# Patient Record
Sex: FEMALE | Race: BLACK OR AFRICAN AMERICAN | NOT HISPANIC OR LATINO
[De-identification: names, ages, dates, MRNs, and addresses within clinical notes are randomized per-mention and may not be internally consistent; named-entity substitution may affect disease eponyms.]

---

## 2020-10-27 PROBLEM — Z00.00 ENCOUNTER FOR PREVENTIVE HEALTH EXAMINATION: Status: ACTIVE | Noted: 2020-10-27

## 2020-11-06 ENCOUNTER — APPOINTMENT (OUTPATIENT)
Dept: GASTROENTEROLOGY | Facility: CLINIC | Age: 70
End: 2020-11-06
Payer: MEDICARE

## 2020-11-06 VITALS
WEIGHT: 163 LBS | HEIGHT: 72 IN | OXYGEN SATURATION: 97 % | BODY MASS INDEX: 22.08 KG/M2 | RESPIRATION RATE: 15 BRPM | SYSTOLIC BLOOD PRESSURE: 142 MMHG | TEMPERATURE: 98.1 F | DIASTOLIC BLOOD PRESSURE: 90 MMHG

## 2020-11-06 DIAGNOSIS — Z12.11 ENCOUNTER FOR SCREENING FOR MALIGNANT NEOPLASM OF COLON: ICD-10-CM

## 2020-11-06 DIAGNOSIS — Z12.12 ENCOUNTER FOR SCREENING FOR MALIGNANT NEOPLASM OF COLON: ICD-10-CM

## 2020-11-06 PROCEDURE — 36415 COLL VENOUS BLD VENIPUNCTURE: CPT

## 2020-11-06 PROCEDURE — 99203 OFFICE O/P NEW LOW 30 MIN: CPT | Mod: 25

## 2020-11-08 NOTE — HISTORY OF PRESENT ILLNESS
[Heartburn] : denies heartburn [Nausea] : denies nausea [Vomiting] : denies vomiting [Diarrhea] : denies diarrhea [Constipation] : denies constipation [Yellow Skin Or Eyes (Jaundice)] : denies jaundice [Abdominal Pain] : denies abdominal pain [Abdominal Swelling] : denies abdominal swelling [Rectal Pain] : denies rectal pain [Wt Gain ___ Lbs] : recent [unfilled] ~Upound(s) weight gain [Wt Loss ___ Lbs] : no recent weight loss [GERD] : no gastroesophageal reflux disease [Hiatus Hernia] : no hiatus hernia [Peptic Ulcer Disease] : no peptic ulcer disease [Pancreatitis] : no pancreatitis [Cholelithiasis] : no cholelithiasis [de-identified] : 71 yo F PSH of colon surgery (unclear reason, unknown how much resected) and spine surgeries who presents for evaluation for a screening colonoscopy. \par \par Starting in march she felt like she lost all her muscles after gyms closed, stopped doing PT etc. Still feels weak. She has recently started working with PT again, but has felt like she made several steps backwards, feels like starting over. \par \par Had echocardiogram 1 week ago, CT scan of chest - had this done because she requested it for weakness. \par Her gynecologist recently retired. \par Cyst in 2010 removed from spine\par Another cervical spine surgery 1995\par \par Works with physical therapy\par \par Now presents for a colonoscopy because states it has been 5+ yrs since last one and she was told to follow up in 5 years. \par Last colonoscopy was done at [facility] (per the patient) though no records can be located for review, cannot recall how long ago it was. Getting colonoscopies every 5 years. She cannot recall the name of the doctor - thinks Dr. Edwardo Hairston, but when looking him up appears his speciality is obgyn.\par She reports a few polyps were found in the past, benign, that were removed. Nothing cancerous. \par \par No heartburn, nausea, vomiting, diarrhea, constipation, no changes in bowel habits, no bleeding. Gets occasional constipation relieved by milk of magnesia. \par \par No blood thinners, asa plavix, coumadin.\par \par PMH: \par denies\par \par PSH: \par 2 spine surgeries\par temporary colostomy in the past (intestinal blockage), some of her colon was resected (in the hospital for 1 month)\par botched breast reduction\par bunyon removal HSS\par Hammartoe surgery\par Finger surgery for cut nerve on R hand, tried to reconnect nerve at [facility] (3 yrs ago)\par \par MEDS: one a day vitamin, calcium\par \par ALL: flagyl (35 yrs ago), hives\par \par FH: \par lung cancer in her family \par a lot of family passed away when young\par aunt with breast cancer\par \par SH: \par never smoker\par No EtOH\par No illicit drug use\par \par \par

## 2020-11-08 NOTE — ASSESSMENT
[FreeTextEntry1] : 71 yo F PSH of colon surgery (unknown indication, unknown how much was resected) and spine surgeries who presents for evaluation for a screening colonoscopy. \par \par Evaluation for screening colonoscopy\par - plan for colonoscopy. Discussed risks and benefits of procedure as well as bowel prep. Patient expressses understanding and wishes to proceed\par - pre-procedure bloodwork today (patient is requesting COVID-19 Ab as well which we will order, states she gets PCRs but not Ab, PCR have been negative)\par \par Follow up post-procedure

## 2020-11-08 NOTE — PHYSICAL EXAM
[General Appearance - Alert] : alert [General Appearance - In No Acute Distress] : in no acute distress [General Appearance - Well Nourished] : well nourished [General Appearance - Well Developed] : well developed [General Appearance - Well-Appearing] : healthy appearing [Sclera] : the sclera and conjunctiva were normal [PERRL With Normal Accommodation] : pupils were equal in size, round, and reactive to light [Extraocular Movements] : extraocular movements were intact [Outer Ear] : the ears and nose were normal in appearance [Examination Of The Oral Cavity] : the lips and gums were normal [Both Tympanic Membranes Were Examined] : both tympanic membranes were normal [Nasal Cavity] : the nasal mucosa and septum were normal [Oropharynx] : the oropharynx was normal [Neck Appearance] : the appearance of the neck was normal [Neck Cervical Mass (___cm)] : no neck mass was observed [Jugular Venous Distention Increased] : there was no jugular-venous distention [Thyroid Diffuse Enlargement] : the thyroid was not enlarged [Thyroid Nodule] : there were no palpable thyroid nodules [Exaggerated Use Of Accessory Muscles For Inspiration] : no accessory muscle use [Auscultation Breath Sounds / Voice Sounds] : lungs were clear to auscultation bilaterally [Heart Sounds] : normal S1 and S2 [Heart Rate And Rhythm] : heart rate was normal and rhythm regular [Heart Sounds Gallop] : no gallops [Murmurs] : no murmurs [Heart Sounds Pericardial Friction Rub] : no pericardial rub [Full Pulse] : the pedal pulses are present [Edema] : there was no peripheral edema [Bowel Sounds] : normal bowel sounds [Abdomen Soft] : soft [Abdomen Tenderness] : non-tender [Abdomen Mass (___ Cm)] : no abdominal mass palpated [Abdomen Hernia] : no hernia was discovered [Cervical Lymph Nodes Enlarged Posterior Bilaterally] : posterior cervical [Cervical Lymph Nodes Enlarged Anterior Bilaterally] : anterior cervical [No CVA Tenderness] : no ~M costovertebral angle tenderness [No Spinal Tenderness] : no spinal tenderness [Abnormal Walk] : normal gait [Nail Clubbing] : no clubbing  or cyanosis of the fingernails [Musculoskeletal - Swelling] : no joint swelling seen [Motor Tone] : muscle strength and tone were normal [Skin Color & Pigmentation] : normal skin color and pigmentation [Skin Turgor] : normal skin turgor [] : no rash [Deep Tendon Reflexes (DTR)] : deep tendon reflexes were 2+ and symmetric [Sensation] : the sensory exam was normal to light touch and pinprick [No Focal Deficits] : no focal deficits [Oriented To Time, Place, And Person] : oriented to person, place, and time [Impaired Insight] : insight and judgment were intact [Affect] : the affect was normal

## 2020-11-09 LAB
ALBUMIN SERPL ELPH-MCNC: 4.5 G/DL
ALP BLD-CCNC: 97 U/L
ALT SERPL-CCNC: 21 U/L
ANION GAP SERPL CALC-SCNC: 11 MMOL/L
AST SERPL-CCNC: 20 U/L
BASOPHILS # BLD AUTO: 0.03 K/UL
BASOPHILS NFR BLD AUTO: 0.5 %
BILIRUB SERPL-MCNC: 0.5 MG/DL
BUN SERPL-MCNC: 14 MG/DL
CALCIUM SERPL-MCNC: 9.5 MG/DL
CHLORIDE SERPL-SCNC: 103 MMOL/L
CO2 SERPL-SCNC: 25 MMOL/L
CREAT SERPL-MCNC: 0.85 MG/DL
EOSINOPHIL # BLD AUTO: 0.06 K/UL
EOSINOPHIL NFR BLD AUTO: 1 %
GLUCOSE SERPL-MCNC: 78 MG/DL
HCT VFR BLD CALC: 40.9 %
HGB BLD-MCNC: 11.9 G/DL
IMM GRANULOCYTES NFR BLD AUTO: 0.2 %
INR PPP: 1.05 RATIO
LYMPHOCYTES # BLD AUTO: 2.78 K/UL
LYMPHOCYTES NFR BLD AUTO: 46 %
MAN DIFF?: NORMAL
MCHC RBC-ENTMCNC: 22.2 PG
MCHC RBC-ENTMCNC: 29.1 GM/DL
MCV RBC AUTO: 76.3 FL
MONOCYTES # BLD AUTO: 0.5 K/UL
MONOCYTES NFR BLD AUTO: 8.3 %
NEUTROPHILS # BLD AUTO: 2.66 K/UL
NEUTROPHILS NFR BLD AUTO: 44 %
PLATELET # BLD AUTO: 306 K/UL
POTASSIUM SERPL-SCNC: 4.6 MMOL/L
PROT SERPL-MCNC: 7.8 G/DL
PT BLD: 12.4 SEC
RBC # BLD: 5.36 M/UL
RBC # FLD: 16.8 %
SARS-COV-2 IGG SERPL IA-ACNC: 11.3 INDEX
SARS-COV-2 IGG SERPL QL IA: POSITIVE
SODIUM SERPL-SCNC: 139 MMOL/L
WBC # FLD AUTO: 6.04 K/UL

## 2020-11-16 ENCOUNTER — APPOINTMENT (OUTPATIENT)
Dept: GASTROENTEROLOGY | Facility: HOSPITAL | Age: 70
End: 2020-11-16

## 2020-11-16 LAB — SARS-COV-2 N GENE NPH QL NAA+PROBE: NOT DETECTED

## 2020-12-23 PROBLEM — Z12.11 ENCOUNTER FOR COLORECTAL CANCER SCREENING: Status: RESOLVED | Noted: 2020-11-06 | Resolved: 2020-12-23

## 2021-06-28 ENCOUNTER — OUTPATIENT (OUTPATIENT)
Dept: OUTPATIENT SERVICES | Facility: HOSPITAL | Age: 71
LOS: 1 days | Discharge: HOME | End: 2021-06-28
Payer: MEDICARE

## 2021-06-28 ENCOUNTER — APPOINTMENT (OUTPATIENT)
Dept: GASTROENTEROLOGY | Facility: HOSPITAL | Age: 71
End: 2021-06-28

## 2021-06-28 ENCOUNTER — RESULT REVIEW (OUTPATIENT)
Age: 71
End: 2021-06-28

## 2021-06-28 PROCEDURE — 45380 COLONOSCOPY AND BIOPSY: CPT

## 2021-06-28 PROCEDURE — 88305 TISSUE EXAM BY PATHOLOGIST: CPT

## 2021-06-28 PROCEDURE — 88305 TISSUE EXAM BY PATHOLOGIST: CPT | Mod: 26

## 2021-06-28 PROCEDURE — 45380 COLONOSCOPY AND BIOPSY: CPT | Mod: PT

## 2021-06-29 LAB — SURGICAL PATHOLOGY STUDY: SIGNIFICANT CHANGE UP

## 2021-07-15 ENCOUNTER — APPOINTMENT (OUTPATIENT)
Dept: GASTROENTEROLOGY | Facility: CLINIC | Age: 71
End: 2021-07-15
Payer: MEDICARE

## 2021-07-15 DIAGNOSIS — R11.2 NAUSEA WITH VOMITING, UNSPECIFIED: ICD-10-CM

## 2021-07-15 PROCEDURE — 99443: CPT | Mod: 95

## 2021-07-16 ENCOUNTER — APPOINTMENT (OUTPATIENT)
Dept: ULTRASOUND IMAGING | Facility: CLINIC | Age: 71
End: 2021-07-16
Payer: MEDICARE

## 2021-07-16 ENCOUNTER — RESULT REVIEW (OUTPATIENT)
Age: 71
End: 2021-07-16

## 2021-07-16 PROCEDURE — 74018 RADEX ABDOMEN 1 VIEW: CPT | Mod: 26

## 2021-07-16 PROCEDURE — 76700 US EXAM ABDOM COMPLETE: CPT | Mod: 26

## 2021-07-16 RX ORDER — POLYETHYLENE GLYCOL 3350 AND ELECTROLYTES WITH LEMON FLAVOR 236; 22.74; 6.74; 5.86; 2.97 G/4L; G/4L; G/4L; G/4L; G/4L
236 POWDER, FOR SOLUTION ORAL
Qty: 1 | Refills: 0 | Status: DISCONTINUED | COMMUNITY
Start: 2020-11-16 | End: 2021-07-16

## 2021-07-16 NOTE — ASSESSMENT
[FreeTextEntry1] : 70 yo F PSH of colon surgery (unclear reason, unknown how much resected) and spine surgeries, seen by Dr Lott in November 2020,  and now here for follow up after colonoscopy \par \par Nausea/constipation \par - recent colonoscopy negative \par - advised patient to get abdominal ultrasound nausea could be due to gallstones\par - abdominal xray to evaluate is symptoms could be due to large stool burden \par - discussed possible medications to aid symptoms whole work up in progress but patient would rather find root cause first \par - discussed possibly needing EGD depending on results and patient agreeable \par \par f/u after above \par

## 2021-07-16 NOTE — HISTORY OF PRESENT ILLNESS
[Heartburn] : denies heartburn [Nausea] : denies nausea [Vomiting] : denies vomiting [Diarrhea] : denies diarrhea [Constipation] : denies constipation [Yellow Skin Or Eyes (Jaundice)] : denies jaundice [Abdominal Pain] : denies abdominal pain [Abdominal Swelling] : denies abdominal swelling [Rectal Pain] : denies rectal pain [Wt Gain ___ Lbs] : recent [unfilled] ~Upound(s) weight gain [Wt Loss ___ Lbs] : no recent weight loss [GERD] : no gastroesophageal reflux disease [Hiatus Hernia] : no hiatus hernia [Peptic Ulcer Disease] : no peptic ulcer disease [Pancreatitis] : no pancreatitis [Cholelithiasis] : no cholelithiasis [de-identified] : 70 yo F PSH of colon surgery (unclear reason, unknown how much resected) and spine surgeries, seen by Dr Lott in November 2020,  and now here for follow up after colonoscopy \par \par 7/15/21\par - for last 3 weeks, wakes up in morning and is so nauseous (goes from zero to 10), queasiness \par - few days ago had to come back home after going out because felt so sick \par - no vomiting\par - has kidney stones on ultrasound and UTI and was on antibiotics\par - gets "warm" in face \par - stool has not been regular, once a day and stool is a little harder. milk of magnesia and Mylanta and did not help \par - no blood in stool and no weight loss - has gained some weight (weighed once a month in assisted living) \par - colonoscopy June 2021 - one tubular adenoma \par \par Previous history: \par Starting in march she felt like she lost all her muscles after gyms closed, stopped doing PT etc. Still feels weak. She has recently started working with PT again, but has felt like she made several steps backwards, feels like starting over. \par \par Had echocardiogram 1 week ago, CT scan of chest - had this done because she requested it for weakness. \par Her gynecologist recently retired. \par Cyst in 2010 removed from spine\par Another cervical spine surgery 1995\par \par Works with physical therapy\par \par Now presents for a colonoscopy because states it has been 5+ yrs since last one and she was told to follow up in 5 years. \par Last colonoscopy was done at [facility] (per the patient) though no records can be located for review, cannot recall how long ago it was. Getting colonoscopies every 5 years. She cannot recall the name of the doctor - thinks Dr. Edwardo Hairston, but when looking him up appears his speciality is obgyn.\par She reports a few polyps were found in the past, benign, that were removed. Nothing cancerous. \par \par No heartburn, nausea, vomiting, diarrhea, constipation, no changes in bowel habits, no bleeding. Gets occasional constipation relieved by milk of magnesia. \par \par No blood thinners, asa plavix, coumadin.\par \par PMH: \par denies\par \par PSH: \par 2 spine surgeries\par temporary colostomy in the past (intestinal blockage), some of her colon was resected (in the hospital for 1 month)\par botched breast reduction\par bunyon removal HSS\par Hammartoe surgery\par Finger surgery for cut nerve on R hand, tried to reconnect nerve at [facility] (3 yrs ago)\par \par MEDS: one a day vitamin, calcium\par \par ALL: flagyl (35 yrs ago), hives\par \par FH: \par lung cancer in her family \par a lot of family passed away when young\par aunt with breast cancer\par \par SH: \par never smoker\par No EtOH\par No illicit drug use

## 2021-07-20 ENCOUNTER — NON-APPOINTMENT (OUTPATIENT)
Age: 71
End: 2021-07-20

## 2021-07-21 ENCOUNTER — NON-APPOINTMENT (OUTPATIENT)
Age: 71
End: 2021-07-21

## 2021-07-22 ENCOUNTER — APPOINTMENT (OUTPATIENT)
Dept: GASTROENTEROLOGY | Facility: CLINIC | Age: 71
End: 2021-07-22

## 2021-08-12 ENCOUNTER — RESULT REVIEW (OUTPATIENT)
Age: 71
End: 2021-08-12

## 2021-08-12 ENCOUNTER — APPOINTMENT (OUTPATIENT)
Age: 71
End: 2021-08-12
Payer: MEDICARE

## 2021-08-12 PROCEDURE — 43239 EGD BIOPSY SINGLE/MULTIPLE: CPT

## 2021-08-24 ENCOUNTER — NON-APPOINTMENT (OUTPATIENT)
Age: 71
End: 2021-08-24

## 2021-08-25 ENCOUNTER — NON-APPOINTMENT (OUTPATIENT)
Age: 71
End: 2021-08-25

## 2021-08-31 ENCOUNTER — NON-APPOINTMENT (OUTPATIENT)
Age: 71
End: 2021-08-31

## 2021-09-05 ENCOUNTER — APPOINTMENT (OUTPATIENT)
Dept: CT IMAGING | Facility: HOSPITAL | Age: 71
End: 2021-09-05

## 2021-09-05 ENCOUNTER — OUTPATIENT (OUTPATIENT)
Dept: OUTPATIENT SERVICES | Facility: HOSPITAL | Age: 71
LOS: 1 days | End: 2021-09-05
Payer: MEDICARE

## 2021-09-05 PROCEDURE — 74160 CT ABDOMEN W/CONTRAST: CPT | Mod: 26,MH

## 2021-09-05 PROCEDURE — 74160 CT ABDOMEN W/CONTRAST: CPT

## 2021-09-15 ENCOUNTER — NON-APPOINTMENT (OUTPATIENT)
Age: 71
End: 2021-09-15

## 2021-09-20 ENCOUNTER — NON-APPOINTMENT (OUTPATIENT)
Age: 71
End: 2021-09-20

## 2021-09-28 LAB — SARS-COV-2 N GENE NPH QL NAA+PROBE: NOT DETECTED

## 2021-11-03 ENCOUNTER — APPOINTMENT (OUTPATIENT)
Dept: GASTROENTEROLOGY | Facility: CLINIC | Age: 71
End: 2021-11-03

## 2021-11-24 ENCOUNTER — APPOINTMENT (OUTPATIENT)
Dept: GASTROENTEROLOGY | Facility: CLINIC | Age: 71
End: 2021-11-24
Payer: MEDICARE

## 2021-11-24 VITALS
OXYGEN SATURATION: 98 % | WEIGHT: 153 LBS | HEIGHT: 72 IN | SYSTOLIC BLOOD PRESSURE: 143 MMHG | HEART RATE: 75 BPM | DIASTOLIC BLOOD PRESSURE: 90 MMHG | BODY MASS INDEX: 20.72 KG/M2 | RESPIRATION RATE: 16 BRPM | TEMPERATURE: 98.1 F

## 2021-11-24 PROCEDURE — 99214 OFFICE O/P EST MOD 30 MIN: CPT

## 2021-11-24 NOTE — PHYSICAL EXAM
[General Appearance - Alert] : alert [General Appearance - In No Acute Distress] : in no acute distress [General Appearance - Well Nourished] : well nourished [General Appearance - Well Developed] : well developed [General Appearance - Well-Appearing] : healthy appearing [Sclera] : the sclera and conjunctiva were normal [PERRL With Normal Accommodation] : pupils were equal in size, round, and reactive to light [Extraocular Movements] : extraocular movements were intact [Outer Ear] : the ears and nose were normal in appearance [Examination Of The Oral Cavity] : the lips and gums were normal [Both Tympanic Membranes Were Examined] : both tympanic membranes were normal [Nasal Cavity] : the nasal mucosa and septum were normal [Oropharynx] : the oropharynx was normal [Neck Appearance] : the appearance of the neck was normal [Neck Cervical Mass (___cm)] : no neck mass was observed [Jugular Venous Distention Increased] : there was no jugular-venous distention [Thyroid Diffuse Enlargement] : the thyroid was not enlarged [Thyroid Nodule] : there were no palpable thyroid nodules [Exaggerated Use Of Accessory Muscles For Inspiration] : no accessory muscle use [Auscultation Breath Sounds / Voice Sounds] : lungs were clear to auscultation bilaterally [Heart Rate And Rhythm] : heart rate was normal and rhythm regular [Heart Sounds] : normal S1 and S2 [Heart Sounds Gallop] : no gallops [Murmurs] : no murmurs [Heart Sounds Pericardial Friction Rub] : no pericardial rub [Full Pulse] : the pedal pulses are present [Edema] : there was no peripheral edema [Bowel Sounds] : normal bowel sounds [Abdomen Soft] : soft [Abdomen Tenderness] : non-tender [Abdomen Mass (___ Cm)] : no abdominal mass palpated [Abdomen Hernia] : no hernia was discovered [Cervical Lymph Nodes Enlarged Posterior Bilaterally] : posterior cervical [Cervical Lymph Nodes Enlarged Anterior Bilaterally] : anterior cervical [No CVA Tenderness] : no ~M costovertebral angle tenderness [No Spinal Tenderness] : no spinal tenderness [Abnormal Walk] : normal gait [Nail Clubbing] : no clubbing  or cyanosis of the fingernails [Musculoskeletal - Swelling] : no joint swelling seen [Motor Tone] : muscle strength and tone were normal [Skin Color & Pigmentation] : normal skin color and pigmentation [Skin Turgor] : normal skin turgor [] : no rash [Deep Tendon Reflexes (DTR)] : deep tendon reflexes were 2+ and symmetric [Sensation] : the sensory exam was normal to light touch and pinprick [No Focal Deficits] : no focal deficits [Oriented To Time, Place, And Person] : oriented to person, place, and time [Impaired Insight] : insight and judgment were intact [Affect] : the affect was normal

## 2021-11-28 NOTE — ASSESSMENT
[FreeTextEntry1] : 70 yo F PMH colon surgery (unclear reason, unknown how much resected) and spine surgeries presents for follow up. \par \par Evaluation for screening colonoscopy\par - s/p colonoscopy notable for 1 TA\par \par Nausea, now resolved: there may have been a component of stress (patient reports working with a psychotherapist and has been overall feeling better). Also likely component of constipation as noted significant stool burden on CT scan. Furthermore there was some evidence of gastritis on the EGD\par - we reviewed prior CT scan together in clinic today, showed patient stool on the scan\par - gastritis noted on EGD, will hold off PPI given resolution of patient's symptoms\par \par Constipation\par - adequate water intake, fiber in diet and supplement\par - milk of magnesia\par - can take miralax PRN, stool softener PRN\par \par Follow up in 3 months

## 2022-02-16 ENCOUNTER — APPOINTMENT (OUTPATIENT)
Dept: GASTROENTEROLOGY | Facility: CLINIC | Age: 72
End: 2022-02-16

## 2022-11-23 ENCOUNTER — APPOINTMENT (OUTPATIENT)
Dept: GASTROENTEROLOGY | Facility: CLINIC | Age: 72
End: 2022-11-23

## 2022-11-23 VITALS
WEIGHT: 148 LBS | TEMPERATURE: 97.2 F | RESPIRATION RATE: 14 BRPM | OXYGEN SATURATION: 96 % | HEART RATE: 72 BPM | SYSTOLIC BLOOD PRESSURE: 125 MMHG | DIASTOLIC BLOOD PRESSURE: 67 MMHG | HEIGHT: 72 IN | BODY MASS INDEX: 20.05 KG/M2

## 2022-11-23 PROCEDURE — 99215 OFFICE O/P EST HI 40 MIN: CPT

## 2022-11-24 NOTE — HISTORY OF PRESENT ILLNESS
[Heartburn] : denies heartburn [Nausea] : denies nausea [Vomiting] : denies vomiting [Diarrhea] : denies diarrhea [Constipation] : denies constipation [Yellow Skin Or Eyes (Jaundice)] : denies jaundice [Abdominal Pain] : denies abdominal pain [Abdominal Swelling] : denies abdominal swelling [Rectal Pain] : denies rectal pain [Wt Gain ___ Lbs] : recent [unfilled] ~Upound(s) weight gain [FreeTextEntry1] : 73 yo F PMH colon surgery (unclear reason, unknown how much resected) and spine surgeries presents for follow up. \par \par Has a tightness in her stomach, started 1-2 mos ago, now more persistent, achy sensation\par Does not wake her up out of sleep\par She says weight is stable, generally fluctuates in the 150s\par \par Feels worse when moving around, especially if doing a quick movement\par \par Doing physical therapy\par She feels tenderness when touching\par She is wondering if it coming from spine (she had lower lumbar surgery and gets lower lumbar discomfort, sciatica and when that happens will radiate around to the front, and that feels similar to what she is feeling now.\par \par CT A/P scan Sept 2021: showed small hiatal hernia, \par kidney stones\par \par As long as she eats oatmeal (tries to eat every day), bananas. When consistent with that no bowel problems. \par Not having constipation. Drinks LOTS of water. \par \par Nausea has resolved \par  [Wt Loss ___ Lbs] : no recent weight loss [GERD] : no gastroesophageal reflux disease [Hiatus Hernia] : no hiatus hernia [Peptic Ulcer Disease] : no peptic ulcer disease [Pancreatitis] : no pancreatitis [Cholelithiasis] : no cholelithiasis [de-identified] : Interim hisotry 11/24/2021\par Since the last visit she underwent a colonoscopy that was notable for a TA. \par Beginning of July 2021 patient started having nausea and saw doctor in assisted living who referred her back to the GI clinic. \par \par She was found to have kidney stones and a UTI, treated with abx. \par In follow up she underwent an abdominal xray and ultrasound that were normal. 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 Then followed up and got CT Scan that showed a little bit of gastroparesis and a small hiatal hernia. Multiple L lower pole nonobstructing renal stones. No hydronephrosis. \par \par She went to see a psychotherapist because she was wondering if she was having stress from COVID. She has slowly been trying to get outside, but nervous because of the virus. \par She started talking to therapist about her stressors of going outside during covid and started going outside. \par And as she worked with therapist she has been feeling better. \par The nausea started 3 weeks before Sept 5 when she had the CT scan. Then it resolved sometime in October. \par \par She was constipated which she feels like got worse during the pandemic. Overall things got worse for her then. She wasn't going out. She was in her house and very stress and figuring out COVID-19. \par \par She is feeling much better now, no longer having any nausea.\par \par INITIAL HPI\par 71 yo F PSH of colon surgery (unclear reason, unknown how much resected) and spine surgeries who presents for evaluation for a screening colonoscopy. \par \par Starting in march she felt like she lost all her muscles after gyms closed, stopped doing PT etc. Still feels weak. She has recently started working with PT again, but has felt like she made several steps backwards, feels like starting over. \par \par Had echocardiogram 1 week ago, CT scan of chest - had this done because she requested it for weakness. \par Her gynecologist recently retired. \par Cyst in 2010 removed from spine\par Another cervical spine surgery 1995\par \par Works with physical therapy\par \par Now presents for a colonoscopy because states it has been 5+ yrs since last one and she was told to follow up in 5 years. \par Last colonoscopy was done at [facility] (per the patient) though no records can be located for review, cannot recall how long ago it was. Getting colonoscopies every 5 years. She cannot recall the name of the doctor - thinks Dr. Edwardo Hairston, but when looking him up appears his speciality is obgyn.\par She reports a few polyps were found in the past, benign, that were removed. Nothing cancerous. \par \par No heartburn, nausea, vomiting, diarrhea, constipation, no changes in bowel habits, no bleeding. Gets occasional constipation relieved by milk of magnesia. \par \par No blood thinners, asa plavix, coumadin.\par \par PMH: \par denies\par \par PSH: \par 2 spine surgeries\par temporary colostomy in the past (intestinal blockage), some of her colon was resected (in the hospital for 1 month)\par botched breast reduction\par bunyon removal HSS\par Hammartoe surgery\par Finger surgery for cut nerve on R hand, tried to reconnect nerve at [facility] (3 yrs ago)\par \par MEDS: one a day vitamin, calcium\par \par ALL: flagyl (35 yrs ago), hives\par \par FH: \par lung cancer in her family \par a lot of family passed away when young\par aunt with breast cancer\par \par SH: \par never smoker\par No EtOH\par No illicit drug use\par \par \par

## 2022-11-24 NOTE — ASSESSMENT
[FreeTextEntry1] : 71 yo F PMH colon surgery (unclear reason, unknown how much resected) and spine surgeries presents for follow up. \par \par Abdominal tightness: worse with movement (patient notes she has been doing more aggressive PT recently), patient agrees likely MSK in nature. This feels similar to when she has sciatica discomfort that radiates to her abdomen. \par - CT A/P Sept 2021 with small HH, gastroptosis, nonobstructing renal stones, but otherwise unremarkable\par - patient offered repeat imaging, however given this may be MSK in nature, she prefers to defer\par \par Evaluation for screening colonoscopy\par - s/p colonoscopy notable for 1 TA June '21\par \par Nausea, now resolved: there may have been a component of stress (patient reports working with a psychotherapist and has been overall feeling better). Also likely component of constipation as noted significant stool burden on CT scan. Furthermore there was some evidence of gastritis on the EGD\par - we reviewed prior CT scan together in clinic today, showed patient stool on the scan\par - gastritis noted on EGD, will hold off PPI given resolution of patient's symptoms\par \par Constipation\par - adequate water intake, fiber in diet and supplement\par - milk of magnesia\par - can take miralax PRN, stool softener PRN\par \par Follow up in 3 months

## 2022-11-24 NOTE — PHYSICAL EXAM
[General Appearance - Alert] : alert [General Appearance - In No Acute Distress] : in no acute distress [General Appearance - Well Nourished] : well nourished [General Appearance - Well Developed] : well developed [General Appearance - Well-Appearing] : healthy appearing [PERRL With Normal Accommodation] : pupils were equal in size, round, and reactive to light [Extraocular Movements] : extraocular movements were intact [Outer Ear] : the ears and nose were normal in appearance [Examination Of The Oral Cavity] : the lips and gums were normal [Both Tympanic Membranes Were Examined] : both tympanic membranes were normal [Nasal Cavity] : the nasal mucosa and septum were normal [Neck Appearance] : the appearance of the neck was normal [Neck Cervical Mass (___cm)] : no neck mass was observed [Jugular Venous Distention Increased] : there was no jugular-venous distention [Thyroid Diffuse Enlargement] : the thyroid was not enlarged [Thyroid Nodule] : there were no palpable thyroid nodules [Exaggerated Use Of Accessory Muscles For Inspiration] : no accessory muscle use [Heart Sounds] : normal S1 and S2 [Heart Sounds Gallop] : no gallops [Heart Sounds Pericardial Friction Rub] : no pericardial rub [Full Pulse] : the pedal pulses are present [Edema] : there was no peripheral edema [Abdomen Mass (___ Cm)] : no abdominal mass palpated [Abdomen Hernia] : no hernia was discovered [Cervical Lymph Nodes Enlarged Posterior Bilaterally] : posterior cervical [Cervical Lymph Nodes Enlarged Anterior Bilaterally] : anterior cervical [No CVA Tenderness] : no ~M costovertebral angle tenderness [No Spinal Tenderness] : no spinal tenderness [Abnormal Walk] : normal gait [Nail Clubbing] : no clubbing  or cyanosis of the fingernails [Musculoskeletal - Swelling] : no joint swelling seen [Motor Tone] : muscle strength and tone were normal [Skin Color & Pigmentation] : normal skin color and pigmentation [Skin Turgor] : normal skin turgor [Deep Tendon Reflexes (DTR)] : deep tendon reflexes were 2+ and symmetric [Sensation] : the sensory exam was normal to light touch and pinprick [No Focal Deficits] : no focal deficits [Impaired Insight] : insight and judgment were intact [Affect] : the affect was normal [Alert] : alert [Normal Voice/Communication] : normal voice/communication [Healthy Appearing] : healthy appearing [No Acute Distress] : no acute distress [Sclera] : the sclera and conjunctiva were normal [Hearing Threshold Finger Rub Not Irion] : hearing was normal [Normal Lips/Gums] : the lips and gums were normal [Oropharynx] : the oropharynx was normal [Normal Appearance] : the appearance of the neck was normal [No Neck Mass] : no neck mass was observed [No Respiratory Distress] : no respiratory distress [No Acc Muscle Use] : no accessory muscle use [Respiration, Rhythm And Depth] : normal respiratory rhythm and effort [Auscultation Breath Sounds / Voice Sounds] : lungs were clear to auscultation bilaterally [Heart Rate And Rhythm] : heart rate was normal and rhythm regular [Normal S1, S2] : normal S1 and S2 [Murmurs] : no murmurs [Bowel Sounds] : normal bowel sounds [Abdomen Tenderness] : non-tender [No Masses] : no abdominal mass palpated [Abdomen Soft] : soft [] : no hepatosplenomegaly [Oriented To Time, Place, And Person] : oriented to person, place, and time

## 2023-11-27 ENCOUNTER — APPOINTMENT (OUTPATIENT)
Dept: GASTROENTEROLOGY | Facility: CLINIC | Age: 73
End: 2023-11-27
Payer: MEDICARE

## 2023-11-27 VITALS
TEMPERATURE: 96.6 F | WEIGHT: 146 LBS | HEIGHT: 72 IN | HEART RATE: 88 BPM | DIASTOLIC BLOOD PRESSURE: 78 MMHG | SYSTOLIC BLOOD PRESSURE: 122 MMHG | BODY MASS INDEX: 19.77 KG/M2 | OXYGEN SATURATION: 95 % | RESPIRATION RATE: 16 BRPM

## 2023-11-27 DIAGNOSIS — K59.09 OTHER CONSTIPATION: ICD-10-CM

## 2023-11-27 PROCEDURE — 99214 OFFICE O/P EST MOD 30 MIN: CPT

## 2023-11-28 LAB
ALBUMIN SERPL ELPH-MCNC: 4.4 G/DL
ALP BLD-CCNC: 100 U/L
ALT SERPL-CCNC: 24 U/L
ANION GAP SERPL CALC-SCNC: 12 MMOL/L
AST SERPL-CCNC: 20 U/L
BILIRUB SERPL-MCNC: 0.4 MG/DL
BUN SERPL-MCNC: 17 MG/DL
CALCIUM SERPL-MCNC: 9.7 MG/DL
CHLORIDE SERPL-SCNC: 106 MMOL/L
CO2 SERPL-SCNC: 26 MMOL/L
CREAT SERPL-MCNC: 0.95 MG/DL
EGFR: 63 ML/MIN/1.73M2
GLUCOSE SERPL-MCNC: 97 MG/DL
HCT VFR BLD CALC: 41.9 %
HGB BLD-MCNC: 12.7 G/DL
MCHC RBC-ENTMCNC: 22.9 PG
MCHC RBC-ENTMCNC: 30.3 GM/DL
MCV RBC AUTO: 75.5 FL
PLATELET # BLD AUTO: 205 K/UL
POTASSIUM SERPL-SCNC: 4.2 MMOL/L
PROT SERPL-MCNC: 7.6 G/DL
RBC # BLD: 5.55 M/UL
RBC # FLD: 16 %
SODIUM SERPL-SCNC: 144 MMOL/L
WBC # FLD AUTO: 3.99 K/UL

## 2024-01-31 ENCOUNTER — APPOINTMENT (OUTPATIENT)
Dept: GASTROENTEROLOGY | Facility: CLINIC | Age: 74
End: 2024-01-31

## 2025-01-22 ENCOUNTER — APPOINTMENT (OUTPATIENT)
Dept: GASTROENTEROLOGY | Facility: CLINIC | Age: 75
End: 2025-01-22

## 2025-06-18 ENCOUNTER — APPOINTMENT (OUTPATIENT)
Dept: GASTROENTEROLOGY | Facility: CLINIC | Age: 75
End: 2025-06-18
Payer: MEDICARE

## 2025-06-18 ENCOUNTER — NON-APPOINTMENT (OUTPATIENT)
Age: 75
End: 2025-06-18

## 2025-06-18 VITALS
HEIGHT: 72 IN | BODY MASS INDEX: 19.91 KG/M2 | OXYGEN SATURATION: 96 % | TEMPERATURE: 96.6 F | DIASTOLIC BLOOD PRESSURE: 98 MMHG | SYSTOLIC BLOOD PRESSURE: 138 MMHG | RESPIRATION RATE: 14 BRPM | HEART RATE: 74 BPM | WEIGHT: 147 LBS

## 2025-06-18 PROBLEM — R10.10 PAIN OF UPPER ABDOMEN: Status: ACTIVE | Noted: 2025-06-18

## 2025-06-18 PROCEDURE — G2211 COMPLEX E/M VISIT ADD ON: CPT

## 2025-06-18 PROCEDURE — 99215 OFFICE O/P EST HI 40 MIN: CPT

## 2025-06-19 LAB
ALBUMIN SERPL ELPH-MCNC: 4.4 G/DL
ALP BLD-CCNC: 89 U/L
ALT SERPL-CCNC: 26 U/L
ANION GAP SERPL CALC-SCNC: 12 MMOL/L
AST SERPL-CCNC: 29 U/L
BASOPHILS # BLD AUTO: 0.03 K/UL
BASOPHILS NFR BLD AUTO: 0.7 %
BILIRUB SERPL-MCNC: 0.5 MG/DL
BUN SERPL-MCNC: 22 MG/DL
CALCIUM SERPL-MCNC: 9.6 MG/DL
CHLORIDE SERPL-SCNC: 105 MMOL/L
CO2 SERPL-SCNC: 23 MMOL/L
CREAT SERPL-MCNC: 0.89 MG/DL
EGFRCR SERPLBLD CKD-EPI 2021: 67 ML/MIN/1.73M2
EOSINOPHIL # BLD AUTO: 0.07 K/UL
EOSINOPHIL NFR BLD AUTO: 1.5 %
GLUCOSE SERPL-MCNC: 78 MG/DL
HCT VFR BLD CALC: 38.4 %
HGB BLD-MCNC: 11.6 G/DL
IMM GRANULOCYTES NFR BLD AUTO: 0.2 %
LYMPHOCYTES # BLD AUTO: 2.43 K/UL
LYMPHOCYTES NFR BLD AUTO: 52.9 %
MAN DIFF?: NORMAL
MCHC RBC-ENTMCNC: 22.2 PG
MCHC RBC-ENTMCNC: 30.2 G/DL
MCV RBC AUTO: 73.4 FL
MONOCYTES # BLD AUTO: 0.35 K/UL
MONOCYTES NFR BLD AUTO: 7.6 %
NEUTROPHILS # BLD AUTO: 1.7 K/UL
NEUTROPHILS NFR BLD AUTO: 37.1 %
PLATELET # BLD AUTO: 229 K/UL
POTASSIUM SERPL-SCNC: 4.4 MMOL/L
PROT SERPL-MCNC: 7.8 G/DL
RBC # BLD: 5.23 M/UL
RBC # FLD: 16.3 %
SODIUM SERPL-SCNC: 139 MMOL/L
WBC # FLD AUTO: 4.59 K/UL

## 2025-06-20 ENCOUNTER — NON-APPOINTMENT (OUTPATIENT)
Age: 75
End: 2025-06-20

## 2025-07-28 ENCOUNTER — APPOINTMENT (OUTPATIENT)
Dept: INTERNAL MEDICINE | Facility: CLINIC | Age: 75
End: 2025-07-28

## 2025-08-20 ENCOUNTER — APPOINTMENT (OUTPATIENT)
Dept: GASTROENTEROLOGY | Facility: CLINIC | Age: 75
End: 2025-08-20